# Patient Record
Sex: MALE | HISPANIC OR LATINO | URBAN - METROPOLITAN AREA
[De-identification: names, ages, dates, MRNs, and addresses within clinical notes are randomized per-mention and may not be internally consistent; named-entity substitution may affect disease eponyms.]

---

## 2021-04-05 ENCOUNTER — EMERGENCY (EMERGENCY)
Facility: HOSPITAL | Age: 36
LOS: 0 days | Discharge: HOME | End: 2021-04-05
Attending: EMERGENCY MEDICINE | Admitting: EMERGENCY MEDICINE
Payer: MEDICAID

## 2021-04-05 VITALS
WEIGHT: 144.84 LBS | SYSTOLIC BLOOD PRESSURE: 118 MMHG | RESPIRATION RATE: 18 BRPM | HEART RATE: 83 BPM | DIASTOLIC BLOOD PRESSURE: 69 MMHG | TEMPERATURE: 98 F | HEIGHT: 63 IN | OXYGEN SATURATION: 100 %

## 2021-04-05 DIAGNOSIS — K08.89 OTHER SPECIFIED DISORDERS OF TEETH AND SUPPORTING STRUCTURES: ICD-10-CM

## 2021-04-05 DIAGNOSIS — K02.9 DENTAL CARIES, UNSPECIFIED: ICD-10-CM

## 2021-04-05 PROCEDURE — 99283 EMERGENCY DEPT VISIT LOW MDM: CPT

## 2021-04-05 RX ORDER — IBUPROFEN 200 MG
600 TABLET ORAL ONCE
Refills: 0 | Status: COMPLETED | OUTPATIENT
Start: 2021-04-05 | End: 2021-04-05

## 2021-04-05 RX ORDER — PENICILLIN V POTASSIUM 250 MG
1 TABLET ORAL
Qty: 40 | Refills: 0
Start: 2021-04-05 | End: 2021-04-14

## 2021-04-05 RX ORDER — PENICILLIN V POTASSIUM 250 MG
500 TABLET ORAL ONCE
Refills: 0 | Status: COMPLETED | OUTPATIENT
Start: 2021-04-05 | End: 2021-04-05

## 2021-04-05 RX ORDER — IBUPROFEN 200 MG
1 TABLET ORAL
Qty: 20 | Refills: 0
Start: 2021-04-05 | End: 2021-04-09

## 2021-04-05 RX ADMIN — Medication 500 MILLIGRAM(S): at 21:09

## 2021-04-05 RX ADMIN — Medication 600 MILLIGRAM(S): at 21:08

## 2021-04-05 NOTE — ED PROVIDER NOTE - PHYSICAL EXAMINATION
CONSTITUTIONAL: Well-developed; well-nourished; in no acute distress.   SKIN: warm, dry  HEAD: Normocephalic; atraumatic.  EYES: normal sclera and conjunctiva   ENT: No nasal discharge; airway clear. +right lower ttp of teeth 31-32 with caries. uvula midline. no exudates.  NECK: Supple; non tender.  EXT: Normal ROM.  No clubbing, cyanosis or edema.   LYMPH: No acute cervical adenopathy.  NEURO: Alert, oriented, grossly unremarkable  PSYCH: Cooperative, appropriate.

## 2021-04-05 NOTE — ED PROVIDER NOTE - NS ED ROS FT
Eyes:  No visual changes, eye pain or discharge.  ENMT:  No hearing changes, pain, discharge or infections. +right lower tooth pain. no sore throat.   Cardiac:  No chest pain, SOB or edema. No chest pain with exertion.  Respiratory:  No cough or respiratory distress. No hemoptysis.   MS:  No myalgia, muscle weakness, joint pain or back pain.  Neuro:  No headache or weakness.  No LOC.  Skin:  No skin rash.

## 2021-04-05 NOTE — ED ADULT TRIAGE NOTE - CHIEF COMPLAINT QUOTE
Pt presents with right lower wisdom tooth pain. requesting it to be removed.  patient endorses difficulty chewing.

## 2021-04-05 NOTE — ED PROVIDER NOTE - NSFOLLOWUPINSTRUCTIONS_ED_ALL_ED_FT
FOLLOW UP IN DENTAL CLINIC TOMORROW MORNING 4/6/21.     Dental Caries    Dental caries (also called tooth decay) is the most common oral disease. It can occur at any age but is more common in children and young adults.     HOW DENTAL CARIES DEVELOPS  The process of decay begins when bacteria and foods (particularly sugars and starches) combine in your mouth to produce plaque. Plaque is a substance that sticks to the hard, outer surface of a tooth (enamel). The bacteria in plaque produce acids that attack enamel. These acids may also attack the root surface of a tooth (cementum) if it is exposed. Repeated attacks dissolve these surfaces and create holes in the tooth (cavities). If left untreated, the acids destroy the other layers of the tooth.     RISK FACTORS  Frequent sipping of sugary beverages.   Frequent snacking on sugary and starchy foods, especially those that easily get stuck in the teeth.   Poor oral hygiene.   Dry mouth.   Substance abuse such as methamphetamine abuse.   Broken or poor-fitting dental restorations.   Eating disorders.   Gastroesophageal reflux disease (GERD).   Certain radiation treatments to the head and neck.     SYMPTOMS  In the early stages of dental caries, symptoms are seldom present. Sometimes white, chalky areas may be seen on the enamel or other tooth layers. In later stages, symptoms may include:    Pits and holes on the enamel.  Toothache after sweet, hot, or cold foods or drinks are consumed.  Pain around the tooth.  Swelling around the tooth.     DIAGNOSIS  Most of the time, dental caries is detected during a regular dental checkup. A diagnosis is made after a thorough medical and dental history is taken and the surfaces of your teeth are checked for signs of dental caries. Sometimes special instruments, such as lasers, are used to check for dental caries. Dental X-ray exams may be taken so that areas not visible to the eye (such as between the contact areas of the teeth) can be checked for cavities.     TREATMENT  If dental caries is in its early stages, it may be reversed with a fluoride treatment or an application of a remineralizing agent at the dental office. Thorough brushing and flossing at home is needed to aid these treatments. If it is in its later stages, treatment depends on the location and extent of tooth destruction:     If a small area of the tooth has been destroyed, the destroyed area will be removed and cavities will be filled with a material such as gold, silver amalgam, or composite resin.   If a large area of the tooth has been destroyed, the destroyed area will be removed and a cap (crown) will be fitted over the remaining tooth structure.   If the center part of the tooth (pulp) is affected, a procedure called a root canal will be needed before a filling or crown can be placed.   If most of the tooth has been destroyed, the tooth may need to be pulled (extracted).     HOME CARE INSTRUCTIONS  You can prevent, stop, or reverse dental caries at home by practicing good oral hygiene. Good oral hygiene includes:     Thoroughly cleaning your teeth at least twice a day with a toothbrush and dental floss.   Using a fluoride toothpaste. A fluoride mouth rinse may also be used if recommended by your dentist or health care provider.   Restricting the amount of sugary and starchy foods and sugary liquids you consume.   Avoiding frequent snacking on these foods and sipping of these liquids.   Keeping regular visits with a dentist for checkups and cleanings.     PREVENTION  Practice good oral hygiene.  Consider a dental sealant. A dental sealant is a coating material that is applied by your dentist to the pits and grooves of teeth. The sealant prevents food from being trapped in them. It may protect the teeth for several years.  Ask about fluoride supplements if you live in a community without fluorinated water or with water that has a low fluoride content. Use fluoride supplements as directed by your dentist or health care provider.  Allow fluoride varnish applications to teeth if directed by your dentist or health care provider.     ADDITIONAL NOTES AND INSTRUCTIONS    Please follow up with your Primary MD in 24-48 hr.  Seek immediate medical care for any new/worsening signs or symptoms.

## 2021-04-05 NOTE — ED PROVIDER NOTE - CLINICAL SUMMARY MEDICAL DECISION MAKING FREE TEXT BOX
35 y.o. male comes in c/o toothache which started a few days ago. No fever. No difficulty swallowing. No SOB. On exam, pt in NAD, AAOx3, head NC/AT, CN II-XII intact, mouth tooth #32 erupting, (+) minimal erythema, (-) gum swelling, (+) caries, airway intact, tongue normal size, lungs CTA B/L, CV S1S2 regular. Will d/c with abx and dental clinic follow up in am. Dental block offered pt refused. Motrin given. Will d/c.

## 2021-04-05 NOTE — ED PROVIDER NOTE - PROGRESS NOTE DETAILS
TA: Will give dose of penicillin VK and motrin in ED. and send antibiotics/motrin to pharmacy. Pt states he will come to dental clinic tomorrow morning.

## 2021-04-05 NOTE — ED PROVIDER NOTE - PATIENT PORTAL LINK FT
You can access the FollowMyHealth Patient Portal offered by [facility] by registering at the following website: [email or web address]. By joining [employer]’s FollowMyHealth portal, you will also be able to view your health information using other applications (apps) compatible with our system.

## 2021-04-05 NOTE — ED PROVIDER NOTE - NSFOLLOWUPCLINICS_GEN_ALL_ED_FT
[facility]  Dental  [address] [place] [id number]  Phone: [phone]  Fax:   Follow Up Time: 1-3 Days

## 2021-04-05 NOTE — ED ADULT NURSE NOTE - OBJECTIVE STATEMENT
Pt complains of right lower wisdom tooth pain. requesting it to be removed. Pt. states that pain makes it difficult to chew

## 2021-04-06 ENCOUNTER — OUTPATIENT (OUTPATIENT)
Dept: OUTPATIENT SERVICES | Facility: HOSPITAL | Age: 36
LOS: 1 days | Discharge: HOME | End: 2021-04-06

## 2021-04-07 DIAGNOSIS — K02.9 DENTAL CARIES, UNSPECIFIED: ICD-10-CM

## 2021-04-19 ENCOUNTER — EMERGENCY (EMERGENCY)
Facility: HOSPITAL | Age: 36
LOS: 0 days | Discharge: HOME | End: 2021-04-19
Attending: EMERGENCY MEDICINE | Admitting: EMERGENCY MEDICINE
Payer: MEDICAID

## 2021-04-19 VITALS
SYSTOLIC BLOOD PRESSURE: 116 MMHG | TEMPERATURE: 97 F | HEART RATE: 90 BPM | DIASTOLIC BLOOD PRESSURE: 66 MMHG | OXYGEN SATURATION: 99 % | RESPIRATION RATE: 17 BRPM

## 2021-04-19 VITALS
HEART RATE: 116 BPM | OXYGEN SATURATION: 100 % | WEIGHT: 154.98 LBS | SYSTOLIC BLOOD PRESSURE: 122 MMHG | TEMPERATURE: 98 F | DIASTOLIC BLOOD PRESSURE: 76 MMHG | RESPIRATION RATE: 18 BRPM | HEIGHT: 63 IN

## 2021-04-19 DIAGNOSIS — T18.9XXA FOREIGN BODY OF ALIMENTARY TRACT, PART UNSPECIFIED, INITIAL ENCOUNTER: ICD-10-CM

## 2021-04-19 DIAGNOSIS — R00.0 TACHYCARDIA, UNSPECIFIED: ICD-10-CM

## 2021-04-19 DIAGNOSIS — F12.920 CANNABIS USE, UNSPECIFIED WITH INTOXICATION, UNCOMPLICATED: ICD-10-CM

## 2021-04-19 LAB
ALBUMIN SERPL ELPH-MCNC: 4.7 G/DL — SIGNIFICANT CHANGE UP (ref 3.5–5.2)
ALP SERPL-CCNC: 93 U/L — SIGNIFICANT CHANGE UP (ref 30–115)
ALT FLD-CCNC: 38 U/L — SIGNIFICANT CHANGE UP (ref 0–41)
ANION GAP SERPL CALC-SCNC: 13 MMOL/L — SIGNIFICANT CHANGE UP (ref 7–14)
AST SERPL-CCNC: 28 U/L — SIGNIFICANT CHANGE UP (ref 0–41)
BASOPHILS # BLD AUTO: 0.05 K/UL — SIGNIFICANT CHANGE UP (ref 0–0.2)
BASOPHILS NFR BLD AUTO: 0.5 % — SIGNIFICANT CHANGE UP (ref 0–1)
BILIRUB SERPL-MCNC: 0.2 MG/DL — SIGNIFICANT CHANGE UP (ref 0.2–1.2)
BUN SERPL-MCNC: 16 MG/DL — SIGNIFICANT CHANGE UP (ref 10–20)
CALCIUM SERPL-MCNC: 9.7 MG/DL — SIGNIFICANT CHANGE UP (ref 8.5–10.1)
CHLORIDE SERPL-SCNC: 99 MMOL/L — SIGNIFICANT CHANGE UP (ref 98–110)
CO2 SERPL-SCNC: 25 MMOL/L — SIGNIFICANT CHANGE UP (ref 17–32)
CREAT SERPL-MCNC: 0.9 MG/DL — SIGNIFICANT CHANGE UP (ref 0.7–1.5)
EOSINOPHIL # BLD AUTO: 0.08 K/UL — SIGNIFICANT CHANGE UP (ref 0–0.7)
EOSINOPHIL NFR BLD AUTO: 0.8 % — SIGNIFICANT CHANGE UP (ref 0–8)
GLUCOSE SERPL-MCNC: 159 MG/DL — HIGH (ref 70–99)
HCT VFR BLD CALC: 43.5 % — SIGNIFICANT CHANGE UP (ref 42–52)
HGB BLD-MCNC: 14.6 G/DL — SIGNIFICANT CHANGE UP (ref 14–18)
IMM GRANULOCYTES NFR BLD AUTO: 0.5 % — HIGH (ref 0.1–0.3)
LYMPHOCYTES # BLD AUTO: 1.35 K/UL — SIGNIFICANT CHANGE UP (ref 1.2–3.4)
LYMPHOCYTES # BLD AUTO: 13 % — LOW (ref 20.5–51.1)
MCHC RBC-ENTMCNC: 29.6 PG — SIGNIFICANT CHANGE UP (ref 27–31)
MCHC RBC-ENTMCNC: 33.6 G/DL — SIGNIFICANT CHANGE UP (ref 32–37)
MCV RBC AUTO: 88.2 FL — SIGNIFICANT CHANGE UP (ref 80–94)
MONOCYTES # BLD AUTO: 0.45 K/UL — SIGNIFICANT CHANGE UP (ref 0.1–0.6)
MONOCYTES NFR BLD AUTO: 4.3 % — SIGNIFICANT CHANGE UP (ref 1.7–9.3)
NEUTROPHILS # BLD AUTO: 8.43 K/UL — HIGH (ref 1.4–6.5)
NEUTROPHILS NFR BLD AUTO: 80.9 % — HIGH (ref 42.2–75.2)
NRBC # BLD: 0 /100 WBCS — SIGNIFICANT CHANGE UP (ref 0–0)
PLATELET # BLD AUTO: 246 K/UL — SIGNIFICANT CHANGE UP (ref 130–400)
POTASSIUM SERPL-MCNC: 4.2 MMOL/L — SIGNIFICANT CHANGE UP (ref 3.5–5)
POTASSIUM SERPL-SCNC: 4.2 MMOL/L — SIGNIFICANT CHANGE UP (ref 3.5–5)
PROT SERPL-MCNC: 7.8 G/DL — SIGNIFICANT CHANGE UP (ref 6–8)
RBC # BLD: 4.93 M/UL — SIGNIFICANT CHANGE UP (ref 4.7–6.1)
RBC # FLD: 12.3 % — SIGNIFICANT CHANGE UP (ref 11.5–14.5)
SODIUM SERPL-SCNC: 137 MMOL/L — SIGNIFICANT CHANGE UP (ref 135–146)
WBC # BLD: 10.41 K/UL — SIGNIFICANT CHANGE UP (ref 4.8–10.8)
WBC # FLD AUTO: 10.41 K/UL — SIGNIFICANT CHANGE UP (ref 4.8–10.8)

## 2021-04-19 PROCEDURE — 99284 EMERGENCY DEPT VISIT MOD MDM: CPT

## 2021-04-19 RX ORDER — FAMOTIDINE 10 MG/ML
20 INJECTION INTRAVENOUS ONCE
Refills: 0 | Status: COMPLETED | OUTPATIENT
Start: 2021-04-19 | End: 2021-04-19

## 2021-04-19 RX ORDER — SODIUM CHLORIDE 9 MG/ML
2000 INJECTION, SOLUTION INTRAVENOUS ONCE
Refills: 0 | Status: COMPLETED | OUTPATIENT
Start: 2021-04-19 | End: 2021-04-19

## 2021-04-19 RX ADMIN — FAMOTIDINE 20 MILLIGRAM(S): 10 INJECTION INTRAVENOUS at 06:08

## 2021-04-19 RX ADMIN — SODIUM CHLORIDE 2000 MILLILITER(S): 9 INJECTION, SOLUTION INTRAVENOUS at 05:55

## 2021-04-19 NOTE — ED ADULT NURSE NOTE - OBJECTIVE STATEMENT
Patient presents to ER with wife at bedside. Patient is A&OX3 in NAD . As per patients wife, patient went to a bar this evening, had a few drinks and a stranger gave him a cookie to eat. Patient states a little while later, the strangers asked him how he felt. Patient states when he got him, he felt confused, dizzy and weak. Patient denies any sob, chest pain, n,v,d, at this time.

## 2021-04-19 NOTE — ED ADULT NURSE NOTE - CHIEF COMPLAINT QUOTE
36 yo M presents to ED after ingesting what the patient believes was a marijuana edible at approx. 10 pm. Patient's wife states that patient "doesn't feel himself." patient was unaware that any drugs were in cookie when he ate it. Patient feels dizzy at this time.

## 2021-04-19 NOTE — ED ADULT TRIAGE NOTE - CHIEF COMPLAINT QUOTE
34 yo M presents to ED after ingesting what the patient believes was a marijuana edible at approx. 10 pm. Patient's wife states that patient "doesn't feel himself." patient was unaware that any drugs were in cookie when he ate it. Patient feels dizzy at this time.

## 2021-04-19 NOTE — ED PROVIDER NOTE - ATTENDING CONTRIBUTION TO CARE
34 yo male without any significant PMH brought by his wife for evaluation of "confusion" and not feeling well for several hours.  Patient went out last night to a bar and was given a marijuana containing cookie.  Patient appears well, NAD, head AT/NC, PERRL, pink conjunctivae,  mmm, nml oropharynx, nml phonation without drooling or trismus, supple neck without midline spine ttp, nml work of breathing, lungs CTA b/l, equal air entry, RRR, well-perfused extremities, distal pulses intact, abdomen soft, NT/ND, BS present in all quadrants, no midline spine or CVA ttp, no leg edema or unilateral calf swelling, A&Ox3, no focal neuro deficits, appears mildly intoxicated.  Will give fluids, and observe in ED.  Patient and wife are amenable with the plan.

## 2021-04-19 NOTE — ED PROVIDER NOTE - PATIENT PORTAL LINK FT
You can access the FollowMyHealth Patient Portal offered by [facility] by registering at the following website: [email or web address]. By joining [employer]’s FollowMyHealth portal, you will also be able to view your health information using other applications (apps) compatible with our system.

## 2021-04-19 NOTE — ED PROVIDER NOTE - CLINICAL SUMMARY MEDICAL DECISION MAKING FREE TEXT BOX
36 yo male without any significant PMH brought by his wife for evaluation of "confusion" and not feeling well for several hours.  Patient went out last night to a bar and was given a marijuana containing cookie.  Patient appears well, NAD, head AT/NC, PERRL, pink conjunctivae,  mmm, nml oropharynx, nml phonation without drooling or trismus, supple neck without midline spine ttp, nml work of breathing, lungs CTA b/l, equal air entry, RRR, well-perfused extremities, distal pulses intact, abdomen soft, NT/ND, BS present in all quadrants, no midline spine or CVA ttp, no leg edema or unilateral calf swelling, A&Ox3, no focal neuro deficits, appears mildly intoxicated.  Will give fluids, and observe in ED.  Patient and wife are amenable with the plan.

## 2021-04-19 NOTE — ED PROVIDER NOTE - PHYSICAL EXAMINATION
CONSTITUTIONAL: in NAD  SKIN: Warm dry, normal skin turgor  HEAD: NCAT  EYES: EOMI, PERRLA, no scleral icterus, conjunctiva pink  ENT: normal pharynx with no erythema or exudates  NECK: Supple; non tender. Full ROM.  CARD: tachy, no murmurs.  RESP: clear to ausculation b/l. No crackles or wheezing.  ABD: soft, non-tender, non-distended, no rebound or guarding.  EXT: Full ROM, no bony tenderness, no pedal edema, no calf tenderness  NEURO: normal motor. normal sensory.  PSYCH: Cooperative, appropriate.

## 2021-04-19 NOTE — ED PROVIDER NOTE - NSFOLLOWUPCLINICS_GEN_ALL_ED_FT
[facility]  Medicine  [address], [place]   Phone: [phone]  Fax:   Follow Up Time: Urgent

## 2021-04-19 NOTE — ED PROVIDER NOTE - OBJECTIVE STATEMENT
35 y.o M w/ no pmhx p/w wife for consumption of unknown "cookie". Per wife, pt got mad at her and left to the bar and began drinking with random people at the bar. One of the people at the bar took a cookie and split in 4 for each of them to have and he also had a piece. Pt was initially okay but when he came home he was too drunk to communicate so wife was concerned about what he consumed. Pt denies ever doing any kind of drugs other than alcohol so he is unsure what it's supposed to feel like to be intoxicated with marijuana. No cp, no sob, no abd pain, no n/v, no HA, no dizziness, no fevers, pt denies trauma.

## 2021-04-19 NOTE — ED PROVIDER NOTE - NSFOLLOWUPINSTRUCTIONS_ED_ALL_ED_FT
WHAT YOU NEED TO KNOW:    Cannabis, also called marijuana, pot, weed, or hash, is a drug that comes from the cannabis sativa (hemp plant). The medicinal use of cannabis is also called medical marijuana. The whole plant or its extracts can be used to control or relieve medical or mental health conditions. The effects may start right away and last for 3 to 4 hours. Cannabis may be taken in the form of a pill, capsule, oil, or mouth spray. Cannabis can also be smoked, baked into food, or made into tea.    DISCHARGE INSTRUCTIONS:  Call your local emergency number (911 in the ) if: You have any of the following signs of a heart attack:   •Squeezing, pressure, or pain in your chest  •You may also have any of the following: ?Discomfort or pain in your back, neck, jaw, stomach, or arm  ?Shortness of breath  ?Nausea or vomiting  ?Lightheadedness or a sudden cold sweat    Call your doctor if:   •Your symptoms do not improve.  •You feel you are becoming dependent on cannabis.  •You have stopped using cannabis, and feel that you cannot cope with your withdrawal symptoms.  •You have questions or concerns about your condition or care.    Medical conditions or symptoms cannabis can be used to treat:   •Pain or inflammation  •Nausea, vomiting, loss of appetite, or weight loss  •Tingling or numbness from nerve damage  •Mood and sleep problems  •Muscle spasms, tremors (shaking), seizures, or tics  •Fluid pressure in the eye from glaucoma    Risks of cannabis use:   •Cannabis can vary in quality and strength. It may work well for some people, but not for others. The amount of cannabis needed, when to use it, or if it is working may not be clear. It may interfere with your ability to drive a car or operate machinery. If you are pregnant and use cannabis, it may prevent your unborn baby from growing normally.  •Cannabis can make you feel tired, drunk, dizzy, or high. It can also cause or worsen some of the effects you are trying to relieve. Cannabis can cause anxiety, confusion, decreased memory, or difficulty learning. Cannabis increases the risk of panic disorder, depression, or seeing or hearing things that are not real. If you use cannabis for a long time and then stop, you may have withdrawal symptoms. You may feel angry, anxious, nervous, or restless. You may lose your appetite, lose weight, or have problems sleeping.  •Cannabis may contain harmful substances, such as metals or fungus. It may increase your risk for a lung infection, long-term bronchitis, asthma, or other lung diseases. Smoking cannabis may increase your risk of cancer of the head, neck, and lungs. Cannabis may also increase the risk of a heart attack or stroke. When taken with other medicine, cannabis increases the risk for side effects.  •Cannabis can cause problems absorbing nutrients if you have liver problems. Also, if you have liver problems, cannabis use can cause your liver to scar and not work properly.     What you should know about cannabis use:   •Learn and follow the laws about the use of medicinal cannabis in the area where you live.  •Tell your healthcare providers about all of the drugs you take. If you use cannabis, tell them when and why you use it.  •See your healthcare provider regularly. Your provider may want to check your blood pressure or make sure cannabis is not affecting other medicines you take.  •Talk to your healthcare provider about the use of cannabis pills, capsules, sprays, or vaporizers, instead of cigarettes.  •Do not smoke or vape cannabis if you have respiratory problems such as asthma or COPD.   •Do not use cannabis if you are pregnant or breastfeeding. Cannabis stays in fat cells and can be transferred slowly to your baby over a long period of time. Cannabis can affect your baby's growth and development.  •Do not drive or use heavy machinery when you use cannabis.  •Do not drink alcohol or use other drugs or medicines while you are using cannabis.    What you need to know about cannabidiol (CBD): CBD is a chemical produced naturally in cannabis. CBD does not contain tetrahydrocannabinol (THC), the chemical that causes a cannabis high. CBD can be used to help with a substance abuse disorder or to relieve anxiety or depression. CBD may help relieve pain, lower inflammation, and control MS muscle spasms. CBD may also help control some types of seizures. CBD is an extract. This means it was  from the rest of the marijuana plant. It is often made into an oil and dropped under the tongue.    Follow up with your healthcare provider as directed: Write down your questions so you remember to ask them during your visits.

## 2021-06-02 ENCOUNTER — OUTPATIENT (OUTPATIENT)
Dept: OUTPATIENT SERVICES | Facility: HOSPITAL | Age: 36
LOS: 1 days | Discharge: HOME | End: 2021-06-02

## 2021-06-02 ENCOUNTER — APPOINTMENT (OUTPATIENT)
Dept: INTERNAL MEDICINE | Facility: CLINIC | Age: 36
End: 2021-06-02
Payer: COMMERCIAL

## 2021-06-02 VITALS
DIASTOLIC BLOOD PRESSURE: 86 MMHG | OXYGEN SATURATION: 99 % | HEART RATE: 89 BPM | HEIGHT: 61.5 IN | TEMPERATURE: 98.4 F | WEIGHT: 145 LBS | BODY MASS INDEX: 27.03 KG/M2 | SYSTOLIC BLOOD PRESSURE: 123 MMHG

## 2021-06-02 DIAGNOSIS — Z78.9 OTHER SPECIFIED HEALTH STATUS: ICD-10-CM

## 2021-06-02 DIAGNOSIS — Z00.00 ENCOUNTER FOR GENERAL ADULT MEDICAL EXAMINATION WITHOUT ABNORMAL FINDINGS: ICD-10-CM

## 2021-06-02 DIAGNOSIS — Z00.00 ENCOUNTER FOR GENERAL ADULT MEDICAL EXAMINATION W/OUT ABNORMAL FINDINGS: ICD-10-CM

## 2021-06-02 PROCEDURE — 99203 OFFICE O/P NEW LOW 30 MIN: CPT

## 2021-06-03 PROBLEM — Z78.9 OTHER SPECIFIED HEALTH STATUS: Chronic | Status: ACTIVE | Noted: 2021-04-23

## 2021-06-07 PROBLEM — Z78.9 NON-SMOKER: Status: ACTIVE | Noted: 2021-06-07

## 2021-06-07 NOTE — ASSESSMENT
[FreeTextEntry1] : 34 yo presented to establish care. Patient has no PMH and is currently asymptomatic. \par We will order routine labs.\par The patient was advised about common risks, including the need to seek attention for mental problems or substance abuse concerns, to maintain regular vigorous physical activity and healthy diet, to practice safe sex and to avoid dangerous physical activities and violent situations.\par \par

## 2021-06-07 NOTE — HISTORY OF PRESENT ILLNESS
[FreeTextEntry1] : 35 years old presented to establish care.  [de-identified] : 35 y.o. with no significant PMH, presented to establish care. Patient denies any complaints at this point, feeling fine.

## 2021-08-16 ENCOUNTER — APPOINTMENT (OUTPATIENT)
Dept: INTERNAL MEDICINE | Facility: CLINIC | Age: 36
End: 2021-08-16

## 2021-12-28 NOTE — ED ADULT NURSE NOTE - BSA (M2)
Please call patient x 3 attempts and send letter    Biopsies from colonoscopy were benign. There were precancerous polyps, I recommend a colonoscopy in 5 years.     Dr. Brittany Lemus 
1.62

## 2023-01-24 NOTE — ED ADULT TRIAGE NOTE - MODE OF ARRIVAL
Walk in Histology Selection Override (Optional- Will Default To Parent Diagnosis If N/A): Nodular Basal Cell Carcinoma

## 2023-04-04 ENCOUNTER — EMERGENCY (EMERGENCY)
Facility: HOSPITAL | Age: 38
LOS: 0 days | Discharge: HOME | End: 2023-04-04
Attending: EMERGENCY MEDICINE
Payer: MEDICAID

## 2023-04-04 VITALS
DIASTOLIC BLOOD PRESSURE: 83 MMHG | RESPIRATION RATE: 18 BRPM | TEMPERATURE: 98 F | SYSTOLIC BLOOD PRESSURE: 135 MMHG | HEART RATE: 75 BPM | OXYGEN SATURATION: 96 %

## 2023-04-04 DIAGNOSIS — H92.02 OTALGIA, LEFT EAR: ICD-10-CM

## 2023-04-04 DIAGNOSIS — R51.9 HEADACHE, UNSPECIFIED: ICD-10-CM

## 2023-04-04 DIAGNOSIS — H91.92 UNSPECIFIED HEARING LOSS, LEFT EAR: ICD-10-CM

## 2023-04-04 PROCEDURE — 99284 EMERGENCY DEPT VISIT MOD MDM: CPT

## 2023-04-04 PROCEDURE — 99283 EMERGENCY DEPT VISIT LOW MDM: CPT

## 2023-04-04 RX ORDER — AMOXICILLIN 250 MG/5ML
1 SUSPENSION, RECONSTITUTED, ORAL (ML) ORAL
Qty: 14 | Refills: 0
Start: 2023-04-04 | End: 2023-04-10

## 2023-04-04 NOTE — ED PROVIDER NOTE - PHYSICAL EXAMINATION
VITAL SIGNS: I have reviewed nursing notes and confirm.  CONSTITUTIONAL: well-appearing, non-toxic, NAD  SKIN: Warm dry, normal skin turgor  HEAD: NCAT  EYES: EOMI, PERRLA, no scleral icterus  ENT: Left ear with bulging TM and erythema, Right EM with normal TM. Diminished hearing in left ear. No mastoid tenderness bilaterally. Moist mucous membranes, normal pharynx with no erythema or exudates  NECK: Supple; non tender. Full ROM. No cervical LAD  CARD: RRR, no murmurs, rubs or gallops  RESP: clear to ausculation b/l.  No rales, rhonchi, or wheezing.  ABD: soft, + BS, non-tender, non-distended, no rebound or guarding. No CVA tenderness  EXT: Full ROM, no bony tenderness, no pedal edema, no calf tenderness  NEURO: normal motor. normal sensory. CN II-XII intact. Cerebellar testing normal. Normal gait.  PSYCH: Cooperative, appropriate.

## 2023-04-04 NOTE — ED PROVIDER NOTE - NSFOLLOWUPINSTRUCTIONS_ED_ALL_ED_FT
Nuestros coordinadores de referencias del departamento de emergencias se comunicarán con usted en las próximas 24 a  48 horas de 9:00 a. m. a 5:00 p. m. (de lunes a viernes) con lionel michelle de seguimiento. Espere lionel llamada telefónica del hospital en patsy período de tiempo. Si no recibe lionel llamada o si tiene alguna pregunta o inquietud, puede comunicarse con richa lees [phone].

## 2023-04-04 NOTE — ED PROVIDER NOTE - OBJECTIVE STATEMENT
37-year-old male with no past medical history who presents for diminished hearing and left ear pain.  Patient states that he has noticed worsening hearing over the past few days in his left ear, feels like "his ears are clogged".  Endorses left ear pain.  Denies fevers, chills, chest pain, shortness of breath, ringing in his ears, trauma, nausea, vomiting, diarrhea, abdominal pain, body aches.

## 2023-04-04 NOTE — ED PROVIDER NOTE - CLINICAL SUMMARY MEDICAL DECISION MAKING FREE TEXT BOX
Patient with decreased hearing and pain in the left ear, minimal discomfort in the right ear.  Has been progressive over the last few days.  No fever, neck stiffness or other pain.  Has mild headache around the left ear.  On exam nontoxic, vital signs noted, no meningismus, no signs of mastoiditis, left TM with possible effusion versus debris adjacent to the TM.  Question of signs of cholesteatoma.  Right TM clear with no signs of infection or abnormalities.  No focal neurological deficits and cranial nerves II through XII intact.  Given possibility of otitis as well as pain and loss of hearing we will give antibiotics and refer for ENT for further management and evaluation.  Return precaution discussed.

## 2023-04-04 NOTE — ED PROVIDER NOTE - PATIENT PORTAL LINK FT
You can access the FollowMyHealth Patient Portal offered by [facility] by registering at the following website: [email or web address]. By joining [employer]’s FollowMyHealth portal, you will also be able to view your health information using other applications (apps) compatible with our system.

## 2023-04-04 NOTE — ED ADULT NURSE NOTE - NS ED NURSE DISCH DISPOSITION

## 2023-04-04 NOTE — ED PROVIDER NOTE - NSFOLLOWUPCLINICS_GEN_ALL_ED_FT
[facility]  ENT  [address], [address]nd [place] [address]  Phone: [phone]  Fax:   Follow Up Time: 1-3 Days

## 2023-04-04 NOTE — ED PROVIDER NOTE - CARE PROVIDER_API CALL
Fausto Souza)  Otolaryngology  [address] [place] [address], [address] [place] [address]  Phone: [phone]  Fax: [phone]  Follow Up Time: 4-6 Days

## 2023-04-10 ENCOUNTER — APPOINTMENT (OUTPATIENT)
Dept: OTOLARYNGOLOGY | Facility: CLINIC | Age: 38
End: 2023-04-10
Payer: COMMERCIAL

## 2023-04-10 ENCOUNTER — RESULT REVIEW (OUTPATIENT)
Age: 38
End: 2023-04-10

## 2023-04-10 VITALS — HEIGHT: 64 IN | WEIGHT: 145 LBS | BODY MASS INDEX: 24.75 KG/M2

## 2023-04-10 VITALS — HEIGHT: 63 IN

## 2023-04-10 DIAGNOSIS — Z82.49 FAMILY HISTORY OF ISCHEMIC HEART DISEASE AND OTHER DISEASES OF THE CIRCULATORY SYSTEM: ICD-10-CM

## 2023-04-10 DIAGNOSIS — Z83.3 FAMILY HISTORY OF DIABETES MELLITUS: ICD-10-CM

## 2023-04-10 PROCEDURE — 92557 COMPREHENSIVE HEARING TEST: CPT

## 2023-04-10 PROCEDURE — 92550 TYMPANOMETRY & REFLEX THRESH: CPT

## 2023-04-10 PROCEDURE — 92565 STENGER TEST PURE TONE: CPT

## 2023-04-10 PROCEDURE — 99204 OFFICE O/P NEW MOD 45 MIN: CPT | Mod: 25

## 2023-04-10 PROCEDURE — 69801 INCISE INNER EAR: CPT

## 2023-04-10 NOTE — PROCEDURE
[FreeTextEntry1] : Left intratympanic steroid injection [FreeTextEntry2] : Left sudden hearing loss [FreeTextEntry3] : Consent obtained. Canal anesthetized with topical tetracaine. Phenol applied to posterior drum. Dexamethasone injection 24mg/mL was instilled into the middle ear and patient left in position for 15 minutes without speech or swallow. Tolerated well.\par

## 2023-04-10 NOTE — ASSESSMENT
[FreeTextEntry1] : Audio reviewed - left sudden hearing loss\par States no shift in the right ear - HF SNHL noted. May be noise related () but will be vigiliant for bilateral sudden loss and AIED\par Discussed option of oral steroid vs. ITS\par Patient prefers ITS due to history of anxiety\par ITS 1 of 3 today\par MRI ordered

## 2023-04-10 NOTE — DATA REVIEWED
[de-identified] : Audio reviewed\par Right: Normal to moderate HF SNHL, %, type A tymp\par Left: Moderate to mod severe SNHL, WRS 60%, type A tymp

## 2023-04-10 NOTE — HISTORY OF PRESENT ILLNESS
[de-identified] : Patient presents today c/o hearing loss in left ear .   Right ear  tinnitus and left ear hearing loss ,started a week ago. States hearing in the right ear has not changed aside from the ringing. He is a construction  worker.  No audiogram performed .  Has been having ear  discomfort due to loud  noises.  He  was  on flight two  prior  to  symptoms ,  ear popped on flight .  He  was  seen in ED ,  placed on amoxicillin .   No   history of ear infections .

## 2023-04-20 ENCOUNTER — APPOINTMENT (OUTPATIENT)
Dept: OTOLARYNGOLOGY | Facility: CLINIC | Age: 38
End: 2023-04-20
Payer: COMMERCIAL

## 2023-04-20 PROCEDURE — 69801 INCISE INNER EAR: CPT

## 2023-04-20 NOTE — HISTORY OF PRESENT ILLNESS
[FreeTextEntry1] : PAtient returns today c/o hearing loss . Here for  second intratympanic injection , has  noticed some  improvement in hearing  since last injection .

## 2023-04-27 ENCOUNTER — APPOINTMENT (OUTPATIENT)
Dept: OTOLARYNGOLOGY | Facility: CLINIC | Age: 38
End: 2023-04-27
Payer: COMMERCIAL

## 2023-04-27 PROCEDURE — 69801 INCISE INNER EAR: CPT

## 2023-04-27 NOTE — HISTORY OF PRESENT ILLNESS
[FreeTextEntry1] : Patient following up today on sudden hearing loss.  Patient here for her third  intratympanic injection . He has had some improvement with his hearing but his tinnitus is worst

## 2023-05-03 ENCOUNTER — APPOINTMENT (OUTPATIENT)
Dept: OTOLARYNGOLOGY | Facility: CLINIC | Age: 38
End: 2023-05-03

## 2023-06-12 ENCOUNTER — OUTPATIENT (OUTPATIENT)
Dept: OUTPATIENT SERVICES | Facility: HOSPITAL | Age: 38
LOS: 1 days | End: 2023-06-12
Payer: COMMERCIAL

## 2023-06-12 DIAGNOSIS — H91.22 SUDDEN IDIOPATHIC HEARING LOSS, LEFT EAR: ICD-10-CM

## 2023-06-12 DIAGNOSIS — Z00.8 ENCOUNTER FOR OTHER GENERAL EXAMINATION: ICD-10-CM

## 2023-06-12 PROCEDURE — A9579: CPT

## 2023-06-12 PROCEDURE — 70553 MRI BRAIN STEM W/O & W/DYE: CPT

## 2023-06-12 PROCEDURE — 70553 MRI BRAIN STEM W/O & W/DYE: CPT | Mod: 26

## 2023-06-13 DIAGNOSIS — H91.22 SUDDEN IDIOPATHIC HEARING LOSS, LEFT EAR: ICD-10-CM

## 2023-06-19 ENCOUNTER — EMERGENCY (EMERGENCY)
Facility: HOSPITAL | Age: 38
LOS: 0 days | Discharge: HOME | End: 2023-06-19
Attending: EMERGENCY MEDICINE
Payer: MEDICAID

## 2023-06-19 VITALS
HEART RATE: 78 BPM | TEMPERATURE: 98 F | RESPIRATION RATE: 18 BRPM | WEIGHT: 139.99 LBS | DIASTOLIC BLOOD PRESSURE: 71 MMHG | OXYGEN SATURATION: 99 % | SYSTOLIC BLOOD PRESSURE: 132 MMHG

## 2023-06-19 DIAGNOSIS — R21 RASH AND OTHER NONSPECIFIC SKIN ERUPTION: ICD-10-CM

## 2023-06-19 DIAGNOSIS — L50.9 URTICARIA, UNSPECIFIED: ICD-10-CM

## 2023-06-19 PROCEDURE — 99283 EMERGENCY DEPT VISIT LOW MDM: CPT

## 2023-06-19 PROCEDURE — 99284 EMERGENCY DEPT VISIT MOD MDM: CPT

## 2023-06-19 RX ADMIN — Medication 60 MILLIGRAM(S): at 14:41

## 2023-06-19 NOTE — ED PROVIDER NOTE - PATIENT PORTAL LINK FT
You can access the FollowMyHealth Patient Portal offered by [facility] by registering at the following website: [email or web address]. By joining [email or web address]’s FollowMyHealth portal, you will also be able to view your health information using other applications (apps) compatible with our system.

## 2023-06-19 NOTE — ED PROVIDER NOTE - CLINICAL SUMMARY MEDICAL DECISION MAKING FREE TEXT BOX
This patient who presents with rash for consistent with urticaria. History and exam findings not consistent with dangerous etiologies of rash such as SJS/TEN, or secondary dangerous causes such as petechial rashes from thrombocytopenia or rickettsial infections. Rash does not appear urticarial with no signs of anaphylaxis either. Plan at this time is to treat symptomatically, instruct to follow up with PCP or derm PRN.     They were given detailed return precautions and advised to return to the emergency department if any new symptoms developed, symptoms worsened or for any concerns. They were was offered the opportunity to ask questions and verbalized that they understand the diagnosis and discharge instructions.

## 2023-06-19 NOTE — ED ADULT NURSE NOTE - NSFALLUNIVINTERV_ED_ALL_ED
Bed/Stretcher in lowest position, wheels locked, appropriate side rails in place/Call bell, personal items and telephone in reach/Instruct patient to call for assistance before getting out of bed/chair/stretcher/Non-slip footwear applied when patient is off stretcher/[place] to call system/Physically safe environment - no spills, clutter or unnecessary equipment/Purposeful proactive rounding/Room/bathroom lighting operational, light cord in reach

## 2023-06-19 NOTE — ED PROVIDER NOTE - CHIEF COMPLAINT
The patient is a 37y Male complaining of allergic reaction.
<<-----Click here for Discharge Medication Review

## 2023-06-19 NOTE — ED ADULT NURSE NOTE - OBJECTIVE STATEMENT
Pt c/o intermittent hives w swelling x 1 week. Pt states he got a MRI w dye on tuesday and started having the allergic reactions intermittently starting wednesday till now. Pt denies difficulty breathing

## 2023-06-19 NOTE — ED ADULT TRIAGE NOTE - CHIEF COMPLAINT QUOTE
pt recently had CT scan on Monday 6/12, and 3 days later started havign itching and hives as per pt it has been coming and going

## 2023-06-19 NOTE — ED PROVIDER NOTE - OBJECTIVE STATEMENT
37-year-old male denies significant PMH now presents with pruritic rash to entire body, started on trunk and progressed to face and extremities since 6/14, of note patient had MRI with contrast on 6/12. Pt lives with others, shares bed, no other household members with similar complaints. Denies SOB, fever, n/v/d, discharge, bruising, pain, systemic complaints (other than noted above), new medications (other than noted above), environmental exposures, recent travel. Old chart reviewed. I have reviewed and agree with the initial nursing note, except as documented in my note.

## 2023-06-19 NOTE — ED ADULT NURSE NOTE - NSFALLRISK_ED_ALL_ED
Problem: Patient Care Overview  Goal: Plan of Care Review  Outcome: Ongoing (interventions implemented as appropriate)  Alert to self, VSS on RA, Telemetry monitoring SR-ST, No falls or trauma during shift, bed in low position with wheels locked, alarm active, avasys camera observing, 2-point soft restraints placed to wrists, patient interfering with care and climbing out of bed.        No

## 2023-06-19 NOTE — ED PROVIDER NOTE - NSFOLLOWUPINSTRUCTIONS_ED_ALL_ED_FT
Our Emergency Department Referral Coordinators will be reaching out to you in the next 24-48 hours from 9:00am to 5:00pm with an ALLERGIST follow up appointment. Please expect a phone call from the hospital in that time frame. If you do not receive a call or if you have any questions or concerns, you can reach them at [phone].    BENADRYL - USE AS DIRECTED AT NIGHT  ZYRTEC - USE AS DIRECTED DURING THE DAY

## 2023-06-19 NOTE — ED PROVIDER NOTE - PHYSICAL EXAMINATION
VSS, awake, alert, normal conjunctiva, oropharynx clear, no stridor, drooling, swelling, mobilizing secretions, chest CTAB, +S1/S2, RRR, abdomen soft, NT, urticarial rash noted to b/l extrem and chest region, alert, clear speech, steady gait. Rec steroids start po antihistamine and H2 blocker, outpt f/u.

## 2023-07-05 ENCOUNTER — APPOINTMENT (OUTPATIENT)
Dept: OTOLARYNGOLOGY | Facility: CLINIC | Age: 38
End: 2023-07-05
Payer: COMMERCIAL

## 2023-07-05 VITALS — WEIGHT: 140 LBS | HEIGHT: 61 IN | BODY MASS INDEX: 26.43 KG/M2

## 2023-07-05 PROCEDURE — 99214 OFFICE O/P EST MOD 30 MIN: CPT

## 2023-07-05 NOTE — REASON FOR VISIT
[Subsequent Evaluation] : a subsequent evaluation for [FreeTextEntry2] : SNHL, sudden left hearing loss

## 2023-07-05 NOTE — HISTORY OF PRESENT ILLNESS
[FreeTextEntry1] : Patient returns today c/o SNHL, sudden left hearing loss. Had allergic reaction to contrast after MRI, had hives all over body. Doing well now. MRI 6/12/2023. Here to discuss results.

## 2023-07-05 NOTE — ASSESSMENT
[FreeTextEntry1] : Imaging reviewed\par Repeat HT\par Has had subjective improvement in hearing and tinnitus

## 2023-07-10 ENCOUNTER — APPOINTMENT (OUTPATIENT)
Dept: OTOLARYNGOLOGY | Facility: CLINIC | Age: 38
End: 2023-07-10
Payer: COMMERCIAL

## 2023-07-10 DIAGNOSIS — H91.22 SUDDEN IDIOPATHIC HEARING LOSS, LEFT EAR: ICD-10-CM

## 2023-07-10 DIAGNOSIS — H90.3 SENSORINEURAL HEARING LOSS, BILATERAL: ICD-10-CM

## 2023-07-10 DIAGNOSIS — H93.11 TINNITUS, RIGHT EAR: ICD-10-CM

## 2023-07-10 PROCEDURE — 99214 OFFICE O/P EST MOD 30 MIN: CPT

## 2023-07-10 PROCEDURE — 92550 TYMPANOMETRY & REFLEX THRESH: CPT

## 2023-07-10 PROCEDURE — 92557 COMPREHENSIVE HEARING TEST: CPT

## 2023-07-10 NOTE — DATA REVIEWED
[de-identified] : Audio reviewed - substantial improvement but lingering mid frequency SNHL and WRS 60 to 76%\par PTA 67.5 to 37.5

## 2023-07-10 NOTE — HISTORY OF PRESENT ILLNESS
[FreeTextEntry1] : Patient returns today c/o tinnitus of right ear , b/l SNHL,  sudden left hearing loss .  Patient states his hearing is slowly //improving.  He had a Audiogram done today 07/10/2023.  He is here for the results.

## 2023-07-10 NOTE — ASSESSMENT
[FreeTextEntry1] : Substantial improvement but lingering snhl\par Discussed option of repeating ITS x2, patient declines, understands risks\par Previous MRI reviewed\par Discussed hearing protection\par RV 6 months, will think about HA

## 2023-07-10 NOTE — REASON FOR VISIT
[Subsequent Evaluation] : a subsequent evaluation for [FreeTextEntry2] : tinnitus of right ear , b/l SNHL, sudden left hearing loss

## 2024-01-04 ENCOUNTER — APPOINTMENT (OUTPATIENT)
Dept: OTOLARYNGOLOGY | Facility: CLINIC | Age: 39
End: 2024-01-04